# Patient Record
Sex: MALE | Race: ASIAN | Employment: FULL TIME | URBAN - METROPOLITAN AREA
[De-identification: names, ages, dates, MRNs, and addresses within clinical notes are randomized per-mention and may not be internally consistent; named-entity substitution may affect disease eponyms.]

---

## 2023-04-08 ENCOUNTER — APPOINTMENT (OUTPATIENT)
Dept: GENERAL RADIOLOGY | Facility: HOSPITAL | Age: 33
End: 2023-04-08
Attending: EMERGENCY MEDICINE
Payer: COMMERCIAL

## 2023-04-08 ENCOUNTER — HOSPITAL ENCOUNTER (EMERGENCY)
Facility: HOSPITAL | Age: 33
Discharge: HOME | End: 2023-04-08
Attending: EMERGENCY MEDICINE
Payer: COMMERCIAL

## 2023-04-08 VITALS
SYSTOLIC BLOOD PRESSURE: 130 MMHG | RESPIRATION RATE: 18 BRPM | OXYGEN SATURATION: 98 % | HEART RATE: 68 BPM | TEMPERATURE: 99 F | DIASTOLIC BLOOD PRESSURE: 70 MMHG

## 2023-04-08 DIAGNOSIS — S29.011A MUSCLE STRAIN OF CHEST WALL, INITIAL ENCOUNTER: Primary | ICD-10-CM

## 2023-04-08 LAB
ALBUMIN SERPL-MCNC: 4.1 G/DL (ref 3.4–5)
ALBUMIN/GLOB SERPL: 1 {RATIO} (ref 1–2)
ALP LIVER SERPL-CCNC: 105 U/L
ALT SERPL-CCNC: 113 U/L
ANION GAP SERPL CALC-SCNC: 4 MMOL/L (ref 0–18)
AST SERPL-CCNC: 47 U/L (ref 15–37)
ATRIAL RATE: 87 BPM
BASOPHILS # BLD AUTO: 0.03 X10(3) UL (ref 0–0.2)
BASOPHILS NFR BLD AUTO: 0.3 %
BILIRUB SERPL-MCNC: 0.8 MG/DL (ref 0.1–2)
BUN BLD-MCNC: 11 MG/DL (ref 7–18)
CALCIUM BLD-MCNC: 9.2 MG/DL (ref 8.5–10.1)
CHLORIDE SERPL-SCNC: 105 MMOL/L (ref 98–112)
CO2 SERPL-SCNC: 27 MMOL/L (ref 21–32)
CREAT BLD-MCNC: 0.97 MG/DL
D DIMER PPP FEU-MCNC: 0.35 UG/ML FEU (ref ?–0.5)
EOSINOPHIL # BLD AUTO: 0.16 X10(3) UL (ref 0–0.7)
EOSINOPHIL NFR BLD AUTO: 1.6 %
ERYTHROCYTE [DISTWIDTH] IN BLOOD BY AUTOMATED COUNT: 11.9 %
GFR SERPLBLD BASED ON 1.73 SQ M-ARVRAT: 106 ML/MIN/1.73M2 (ref 60–?)
GLOBULIN PLAS-MCNC: 4.3 G/DL (ref 2.8–4.4)
GLUCOSE BLD-MCNC: 132 MG/DL (ref 70–99)
HCT VFR BLD AUTO: 46.8 %
HGB BLD-MCNC: 15.8 G/DL
IMM GRANULOCYTES # BLD AUTO: 0.04 X10(3) UL (ref 0–1)
IMM GRANULOCYTES NFR BLD: 0.4 %
LYMPHOCYTES # BLD AUTO: 2.24 X10(3) UL (ref 1–4)
LYMPHOCYTES NFR BLD AUTO: 21.9 %
MCH RBC QN AUTO: 30 PG (ref 26–34)
MCHC RBC AUTO-ENTMCNC: 33.8 G/DL (ref 31–37)
MCV RBC AUTO: 88.8 FL
MONOCYTES # BLD AUTO: 0.83 X10(3) UL (ref 0.1–1)
MONOCYTES NFR BLD AUTO: 8.1 %
NEUTROPHILS # BLD AUTO: 6.91 X10 (3) UL (ref 1.5–7.7)
NEUTROPHILS # BLD AUTO: 6.91 X10(3) UL (ref 1.5–7.7)
NEUTROPHILS NFR BLD AUTO: 67.7 %
OSMOLALITY SERPL CALC.SUM OF ELEC: 283 MOSM/KG (ref 275–295)
P AXIS: 43 DEGREES
P-R INTERVAL: 140 MS
PLATELET # BLD AUTO: 304 10(3)UL (ref 150–450)
POTASSIUM SERPL-SCNC: 4.1 MMOL/L (ref 3.5–5.1)
PROT SERPL-MCNC: 8.4 G/DL (ref 6.4–8.2)
Q-T INTERVAL: 366 MS
QRS DURATION: 96 MS
QTC CALCULATION (BEZET): 440 MS
R AXIS: 83 DEGREES
RBC # BLD AUTO: 5.27 X10(6)UL
SODIUM SERPL-SCNC: 136 MMOL/L (ref 136–145)
T AXIS: 34 DEGREES
TROPONIN I HIGH SENSITIVITY: 4 NG/L
VENTRICULAR RATE: 87 BPM
WBC # BLD AUTO: 10.2 X10(3) UL (ref 4–11)

## 2023-04-08 PROCEDURE — 99284 EMERGENCY DEPT VISIT MOD MDM: CPT

## 2023-04-08 PROCEDURE — 85379 FIBRIN DEGRADATION QUANT: CPT | Performed by: EMERGENCY MEDICINE

## 2023-04-08 PROCEDURE — 84484 ASSAY OF TROPONIN QUANT: CPT | Performed by: EMERGENCY MEDICINE

## 2023-04-08 PROCEDURE — 85025 COMPLETE CBC W/AUTO DIFF WBC: CPT | Performed by: EMERGENCY MEDICINE

## 2023-04-08 PROCEDURE — 93010 ELECTROCARDIOGRAM REPORT: CPT

## 2023-04-08 PROCEDURE — 93005 ELECTROCARDIOGRAM TRACING: CPT

## 2023-04-08 PROCEDURE — 80053 COMPREHEN METABOLIC PANEL: CPT | Performed by: EMERGENCY MEDICINE

## 2023-04-08 PROCEDURE — 71045 X-RAY EXAM CHEST 1 VIEW: CPT | Performed by: EMERGENCY MEDICINE

## 2023-04-08 PROCEDURE — 99285 EMERGENCY DEPT VISIT HI MDM: CPT

## 2023-04-08 PROCEDURE — 96374 THER/PROPH/DIAG INJ IV PUSH: CPT

## 2023-04-08 RX ORDER — KETOROLAC TROMETHAMINE 15 MG/ML
15 INJECTION, SOLUTION INTRAMUSCULAR; INTRAVENOUS ONCE
Status: COMPLETED | OUTPATIENT
Start: 2023-04-08 | End: 2023-04-08

## 2025-03-24 ENCOUNTER — TELEPHONE (OUTPATIENT)
Facility: LOCATION | Age: 35
End: 2025-03-24

## 2025-03-24 ENCOUNTER — HOSPITAL ENCOUNTER (OUTPATIENT)
Age: 35
Discharge: HOME | End: 2025-03-24
Attending: EMERGENCY MEDICINE
Payer: COMMERCIAL

## 2025-03-24 VITALS
DIASTOLIC BLOOD PRESSURE: 78 MMHG | SYSTOLIC BLOOD PRESSURE: 139 MMHG | HEART RATE: 103 BPM | TEMPERATURE: 98 F | RESPIRATION RATE: 20 BRPM | BODY MASS INDEX: 37.25 KG/M2 | WEIGHT: 275 LBS | HEIGHT: 72 IN | OXYGEN SATURATION: 96 %

## 2025-03-24 DIAGNOSIS — K61.1 PERIRECTAL ABSCESS: Primary | ICD-10-CM

## 2025-03-24 PROCEDURE — 46050 I&D PERIANAL ABSCESS SUPFC: CPT

## 2025-03-24 PROCEDURE — 99214 OFFICE O/P EST MOD 30 MIN: CPT

## 2025-03-24 PROCEDURE — 99213 OFFICE O/P EST LOW 20 MIN: CPT

## 2025-03-24 RX ORDER — HYDROCODONE BITARTRATE AND ACETAMINOPHEN 5; 325 MG/1; MG/1
1-2 TABLET ORAL EVERY 6 HOURS PRN
Qty: 10 TABLET | Refills: 0 | Status: SHIPPED | OUTPATIENT
Start: 2025-03-24 | End: 2025-03-29

## 2025-03-24 RX ORDER — DOCUSATE SODIUM 100 MG/1
100 CAPSULE, LIQUID FILLED ORAL 2 TIMES DAILY
Qty: 20 CAPSULE | Refills: 0 | Status: SHIPPED | OUTPATIENT
Start: 2025-03-24 | End: 2025-04-03

## 2025-03-24 NOTE — ED INITIAL ASSESSMENT (HPI)
Bump near anus noted Wednesday per pt he pinched  it  no drainage.   The next day bump bigger mor painful.  No bleeding noted

## 2025-03-24 NOTE — ED PROVIDER NOTES
Patient Seen in: [facility]      History     Chief Complaint   Patient presents with    Rectal Pain     Stated Complaint: Hemorrhoids    Subjective:   HPI      35-year-old male presents to the immediate care for complaints of perirectal pain for the last 4 days.  He states that he felt a pop and tried to squeeze it.  Since then it has had increased pain swelling and tenderness to the site.  He denies any purulent drainage or rectal bleeding.  No prior history of ulcerative colitis or Crohn's.  No prior history of anal fistula.  No prior history of hemorrhoids.    Objective:     Past Medical History:    Costochondritis              No pertinent past surgical history.              Social History     Socioeconomic History    Marital status: Single   Tobacco Use    Smoking status: Never    Smokeless tobacco: Never   Vaping Use    Vaping status: Never Used   Substance and Sexual Activity    Alcohol use: Not Currently              Review of Systems    Positive for stated complaint: Hemorrhoids  Other systems are as noted in HPI.  Constitutional and vital signs reviewed.      All other systems reviewed and negative except as noted above.    Physical Exam     ED Triage Vitals [03/24/25 0838]   BP (!) 154/92   Pulse 103   Resp 20   Temp 98 °F (36.7 °C)   Temp src    SpO2 96 %   O2 Device        Current Vitals:   Vital Signs  BP: (!) 154/92  Pulse: 103  Resp: 20  Temp: 98 °F (36.7 °C)    Oxygen Therapy  SpO2: 96 %        Physical Exam  General: Alert and oriented. No acute distress.  HEENT: Normocephalic. No evidence of trauma. Extraocular movements are intact.  Rectal exam: Patient is noted to have a focal area of pain tenderness and erythema and swelling to the right perirectal region.  There is a focal area of fluctuance approximately 4 cm from the anal verge.  There is spontaneous drainage from the site.  Extremities: No evidence of deformity. No clubbing or cyanosis.  Neuro: No focal deficit is  noted.    ED Course   Labs Reviewed - No data to display  Patient had local infiltration of lidocaine with 1% epinephrine and Marcaine 0.25% and a 50-50 percent mixture.  he had approximately 4 mL infiltrated in to the site.  A #11 scalpel was then made to use a small incision to express any further pus.  Patient tolerated the procedure well.  A sterile dressing was applied to the site.  Patient will be discharged home with hydrocodone for pain, Colace for stool softener and Augmentin for 10 days.  He will be given follow-up with general surgery.  He is instructed to return to the emergency department for any worsening pain, bleeding, swelling or any fever.       MDM   Patient was screened and evaluated during this visit.   As a treating physician attending to the patient, I determined, within reasonable clinical confidence and prior to discharge, that an emergency medical condition was not or was no longer present.  There was no indication for further evaluation, treatment or admission on an emergency basis.  Comprehensive verbal and written discharge and follow-up instructions were provided to help prevent relapse or worsening.  Patient was instructed to follow-up with her primary care provider for further evaluation and treatment, but to return immediately to the ER for worsening, concerning, new, changing or persisting symptoms.  I discussed the case with the patient and they had no questions, complaints, or concerns.  Patient felt comfortable going home.    ^^Please note that this report has been produced using speech recognition software and may contain errors related to that system including, but not limited to, errors in grammar, punctuation, and spelling, as well as words and phrases that possibly may have been recognized inappropriately.  If there are any questions or concerns, contact the dictating provider for clarification        Medical Decision Making      Disposition and Plan     Clinical  Impression:  1. Perirectal abscess         Disposition:  Discharge  3/24/2025  9:04 am    Follow-up:  Krish Vazquez MD    Schedule an appointment as soon as possible for a visit             Medications Prescribed:  Current Discharge Medication List        START taking these medications    Details   amoxicillin clavulanate 875-125 MG Oral Tab Take 1 tablet by mouth 2 (two) times daily for 10 days.  Qty: 20 tablet, Refills: 0      HYDROcodone-acetaminophen 5-325 MG Oral Tab Take 1-2 tablets by mouth every 6 (six) hours as needed.  Qty: 10 tablet, Refills: 0    Associated Diagnoses: Perirectal abscess      docusate sodium 100 MG Oral Cap Take 1 capsule (100 mg total) by mouth 2 (two) times daily for 20 doses.  Qty: 20 capsule, Refills: 0                 Supplementary Documentation:

## 2025-03-24 NOTE — TELEPHONE ENCOUNTER
Patient called to schedule urgent care follow up. Scheduled for next available 4/22.     Please advise about patient being seen sooner with anyone  Best callback number is [phone]    Future Appointments   Date Time Provider Department Center   4/22/2025 11:00 AM Gilberto Phillip MD [id number]

## 2025-03-24 NOTE — DISCHARGE INSTRUCTIONS
Take a warm bath or hot shower twice daily and after each bowel movement  Take Augmentin twice daily for 10 days  Take hydrocodone as needed for pain every 6 hours  Follow-up with general surgery with [facility].  Call to make a follow-up appointment  Take Colace twice daily to prevent constipation  Return to the emergency department for any worsening pain, swelling, or new onset fever or chills

## 2025-03-24 NOTE — TELEPHONE ENCOUNTER
Discharged instructions reviewed with patient. No questions. Awaiting ride home and meds from pharmacy   Nurse Michaela informed me that the patient can be put on Dr. Nathan's schedule tomorrow at 8:30. Called patient to schedule, no answer, left voicemail.

## 2025-03-25 ENCOUNTER — OFFICE VISIT (OUTPATIENT)
Facility: LOCATION | Age: 35
End: 2025-03-25
Payer: COMMERCIAL

## 2025-03-25 VITALS
HEART RATE: 95 BPM | TEMPERATURE: 98 F | SYSTOLIC BLOOD PRESSURE: 159 MMHG | DIASTOLIC BLOOD PRESSURE: 83 MMHG | OXYGEN SATURATION: 99 % | RESPIRATION RATE: 16 BRPM

## 2025-03-25 DIAGNOSIS — K61.0 PERIANAL ABSCESS: Primary | ICD-10-CM

## 2025-03-25 PROCEDURE — 99203 OFFICE O/P NEW LOW 30 MIN: CPT | Performed by: STUDENT IN AN ORGANIZED HEALTH CARE EDUCATION/TRAINING PROGRAM

## 2025-03-25 NOTE — H&P
Patient ID: Jairo Javier is a 35 year old male.    Chief Complaint   Patient presents with    New Patient     NP- 3/24/25  f/u Perirectal Abscess- was drained yesterday at , reports low garde fever last night, states chest soreness, also, reports bloody drainage from the abscess         HPI: Jairo Javier is a 35 year old male presents to clinic for evaluation.  Patient was in urgent care yesterday with perirectal pain.  He was found to have a perianal abscess and underwent incision and drainage.  He was started on antibiotics and referred to general surgery for further evaluation.  Today, patient reports improvement of his symptoms.  He continues to see drainage.  He reports having a low-grade temperature yesterday but denies any more fevers or chills.  He has never had an abscess like this before.    Workup: None      Past Medical History  Past Medical History:    Costochondritis       Past Surgical History  History reviewed. No pertinent surgical history.    Medications  Current Outpatient Medications   Medication Sig Dispense Refill    amoxicillin clavulanate 875-125 MG Oral Tab Take 1 tablet by mouth 2 (two) times daily for 10 days. 20 tablet 0    HYDROcodone-acetaminophen 5-325 MG Oral Tab Take 1-2 tablets by mouth every 6 (six) hours as needed. 10 tablet 0    docusate sodium 100 MG Oral Cap Take 1 capsule (100 mg total) by mouth 2 (two) times daily for 20 doses. 20 capsule 0       Allergies  Allergies[1]    Social History  History   Smoking Status    Never   Smokeless Tobacco    Never     History   Alcohol Use Not Currently     History   Drug Use Not on file       Family History  History reviewed. No pertinent family history.    Review of Systems  Review of Systems   Constitutional: Negative.    Respiratory: Negative.     Cardiovascular: Negative.    Gastrointestinal:  Positive for rectal pain. Negative for blood in stool, constipation and diarrhea.       Exam  Vitals:    03/25/25 0836   BP: 159/83    Pulse: 95   Resp: 16   Temp: 98.2 °F (36.8 °C)     Physical Exam  Exam conducted with a chaperone present.   Constitutional:       Appearance: Normal appearance.   Cardiovascular:      Rate and Rhythm: Normal rate.   Pulmonary:      Effort: Pulmonary effort is normal.   Genitourinary:      Musculoskeletal:         General: Normal range of motion.   Skin:     General: Skin is warm and dry.   Neurological:      Mental Status: He is alert and oriented to person, place, and time.           Assessment/Plan  Assessment   Problem List Items Addressed This Visit    None  Visit Diagnoses       Perianal abscess    -  Primary            Jairo Javier is a 35 year old male with a perianal abscess    On physical exam, abscess still is indurated but there is no purulence  On digital rectal exam, there was no changes concerning for anal fistula  No acute surgical intervention  Recommend patient finish course of antibiotic  He can follow-up in 1 month for reevaluation  If he is doing well at that time, he can cancel the appointment  If patient continues to see persistent drainage or have an open wound 2 to 3 months out, EUA may be indicated for possible anal fistula    Patient can call the office for any questions or concern    Thank you for letting me participate in the care of this patient      Jessie Nathan MD  General Surgery  [facility]     CC:  None Pcp         [1] No Known Allergies